# Patient Record
Sex: MALE | Race: WHITE | URBAN - METROPOLITAN AREA
[De-identification: names, ages, dates, MRNs, and addresses within clinical notes are randomized per-mention and may not be internally consistent; named-entity substitution may affect disease eponyms.]

---

## 2020-10-13 ENCOUNTER — PROCEDURE (OUTPATIENT)
Dept: URBAN - METROPOLITAN AREA CLINIC 49 | Facility: CLINIC | Age: 70
End: 2020-10-13
Payer: COMMERCIAL

## 2020-10-13 PROCEDURE — 92134 CPTRZ OPH DX IMG PST SGM RTA: CPT | Performed by: OPHTHALMOLOGY

## 2020-10-13 PROCEDURE — 67028 INJECTION EYE DRUG: CPT | Performed by: OPHTHALMOLOGY

## 2020-10-13 ASSESSMENT — INTRAOCULAR PRESSURE
OD: 12
OS: 12

## 2020-11-10 ENCOUNTER — PROCEDURE (OUTPATIENT)
Dept: URBAN - METROPOLITAN AREA CLINIC 49 | Facility: CLINIC | Age: 70
End: 2020-11-10
Payer: MEDICARE

## 2020-11-10 PROCEDURE — 92134 CPTRZ OPH DX IMG PST SGM RTA: CPT | Performed by: OPHTHALMOLOGY

## 2020-11-10 PROCEDURE — 67028 INJECTION EYE DRUG: CPT | Performed by: OPHTHALMOLOGY

## 2020-11-10 ASSESSMENT — INTRAOCULAR PRESSURE
OD: 17
OS: 14

## 2020-12-08 ENCOUNTER — OFFICE VISIT (OUTPATIENT)
Dept: URBAN - METROPOLITAN AREA CLINIC 49 | Facility: CLINIC | Age: 70
End: 2020-12-08
Payer: MEDICARE

## 2020-12-08 DIAGNOSIS — E11.3392 TYPE 2 DIABETES MELLITUS WITH MODERATE NONPROLIFERATIVE DIABETIC RETINOPATHY WITHOUT MACULAR EDEMA, LEFT EYE: ICD-10-CM

## 2020-12-08 PROCEDURE — 92012 INTRM OPH EXAM EST PATIENT: CPT | Performed by: OPHTHALMOLOGY

## 2020-12-08 PROCEDURE — 67028 INJECTION EYE DRUG: CPT | Performed by: OPHTHALMOLOGY

## 2020-12-08 PROCEDURE — 92134 CPTRZ OPH DX IMG PST SGM RTA: CPT | Performed by: OPHTHALMOLOGY

## 2020-12-08 ASSESSMENT — INTRAOCULAR PRESSURE
OD: 19
OS: 17

## 2020-12-08 NOTE — IMPRESSION/PLAN
Impression: Type 2 diabetes mellitus with moderate nonproliferative diabetic retinopathy without macular edema, left eye: L39.3194. Plan: I emphasized the importance of blood sugar and blood pressure control. The patient understands that controlling BS and BP is the best way to stabilize vision at this time. We also discussed the importance of routine dilated eye exams.

## 2020-12-08 NOTE — IMPRESSION/PLAN
Impression: Type 2 diabetes mellitus with moderate nonproliferative diabetic retinopathy with macular edema, right eye: D93.2890. Plan: He has persistent CME OD but slightly improved, after lucentis 0.3 OD x3. OCT confirms CME OD and no IRF/SRF OS. We discussed the findings. This is likely due to DME. We discussed the natural history. Based on today's findings, I recommend treatment to reduce the risk of vision loss. Lucentis 0.3 injected OD without complication after discussing the R/MILENA/ in detail. 
thanks [facility] WALDRON 4-6 weeks OCT Ou; lucentis 0.3 OD#5

## 2021-01-18 ENCOUNTER — OFFICE VISIT (OUTPATIENT)
Dept: URBAN - METROPOLITAN AREA CLINIC 47 | Facility: CLINIC | Age: 71
End: 2021-01-18
Payer: MEDICARE

## 2021-01-18 PROCEDURE — 67028 INJECTION EYE DRUG: CPT | Performed by: OPHTHALMOLOGY

## 2021-01-18 PROCEDURE — 92134 CPTRZ OPH DX IMG PST SGM RTA: CPT | Performed by: OPHTHALMOLOGY

## 2021-01-18 ASSESSMENT — INTRAOCULAR PRESSURE
OS: 18
OD: 17

## 2021-03-01 ENCOUNTER — OFFICE VISIT (OUTPATIENT)
Dept: URBAN - METROPOLITAN AREA CLINIC 47 | Facility: CLINIC | Age: 71
End: 2021-03-01
Payer: MEDICARE

## 2021-03-01 PROCEDURE — 67028 INJECTION EYE DRUG: CPT | Performed by: OPHTHALMOLOGY

## 2021-03-01 PROCEDURE — 92235 FLUORESCEIN ANGRPH MLTIFRAME: CPT | Performed by: OPHTHALMOLOGY

## 2021-03-01 PROCEDURE — 92134 CPTRZ OPH DX IMG PST SGM RTA: CPT | Performed by: OPHTHALMOLOGY

## 2021-03-01 ASSESSMENT — INTRAOCULAR PRESSURE
OS: 15
OD: 17

## 2021-03-01 NOTE — IMPRESSION/PLAN
Impression: Type 2 diabetes mellitus with moderate nonproliferative diabetic retinopathy without macular edema, left eye: K74.9785. Plan: I emphasized the importance of blood sugar and blood pressure control. The patient understands that controlling BS and BP is the best way to stabilize vision at this time. We also discussed the importance of routine dilated eye exams.

## 2021-03-01 NOTE — IMPRESSION/PLAN
Impression: Type 2 diabetes mellitus with moderate nonproliferative diabetic retinopathy with macular edema, right eye: F35.9932. Plan: He feels his vision is unchanged. He has persistent CME OD after lucentis 0.3 OD x5. OCT confirms CME OD>>OS. FA shows few MAs OD and CME OD>OS. We discussed the findings. This is likely due to DME. We discussed the natural history. I recommend treatment to reduce the risk of vision loss. Lucentis 0.3 injected OD without complication after discussing the R/MILENA/ in detail. He will also start PF/ketorolac QID OD. We may consider trying eylea as well. thanks [facility] RTC 4-6 weeks OCT Ou; poss lucentis 0.3 OD

## 2021-03-02 RX ORDER — PREDNISOLONE ACETATE 10 MG/ML
1 % SUSPENSION/ DROPS OPHTHALMIC
Qty: 5 | Refills: 3 | Status: INACTIVE
Start: 2021-03-02 | End: 2021-03-02

## 2021-03-02 RX ORDER — KETOROLAC TROMETHAMINE 5 MG/ML
0.5 % SOLUTION OPHTHALMIC
Qty: 5 | Refills: 3 | Status: INACTIVE
Start: 2021-03-02 | End: 2021-03-02

## 2021-04-05 ENCOUNTER — OFFICE VISIT (OUTPATIENT)
Dept: URBAN - METROPOLITAN AREA CLINIC 47 | Facility: CLINIC | Age: 71
End: 2021-04-05
Payer: MEDICARE

## 2021-04-05 PROCEDURE — 99213 OFFICE O/P EST LOW 20 MIN: CPT | Performed by: OPHTHALMOLOGY

## 2021-04-05 PROCEDURE — 67028 INJECTION EYE DRUG: CPT | Performed by: OPHTHALMOLOGY

## 2021-04-05 PROCEDURE — 92134 CPTRZ OPH DX IMG PST SGM RTA: CPT | Performed by: OPHTHALMOLOGY

## 2021-04-05 ASSESSMENT — INTRAOCULAR PRESSURE
OS: 16
OD: 23

## 2021-04-05 NOTE — IMPRESSION/PLAN
Impression: Type 2 diabetes mellitus with moderate nonproliferative diabetic retinopathy with macular edema, right eye: F30.8494. Plan: He has persistent CME OD after lucentis 0.3 OD x6 and 1 month of topical therapy. OCT confirms CME OD>>OS. FA 3/1/21 shows few MAs OD and CME OD>OS. We discussed the findings. This is likely due to DME. We discussed the natural history. Based on today's findings, I recommend treatment to reduce the risk of vision loss. Lucentis 0.3 injected OD without complication after discussing the R/MILENA/ in detail. He will also taper off PF/ketorolac OD. We may consider trying eylea as well given the edema with lucentis. thanks [employer] RTC 4-6 weeks OCT Ou; eylea  OD#1

## 2021-05-17 ENCOUNTER — PROCEDURE (OUTPATIENT)
Dept: URBAN - METROPOLITAN AREA CLINIC 47 | Facility: CLINIC | Age: 71
End: 2021-05-17
Payer: MEDICARE

## 2021-05-17 PROCEDURE — 67028 INJECTION EYE DRUG: CPT | Performed by: OPHTHALMOLOGY

## 2021-05-17 PROCEDURE — 92134 CPTRZ OPH DX IMG PST SGM RTA: CPT | Performed by: OPHTHALMOLOGY

## 2021-05-17 ASSESSMENT — INTRAOCULAR PRESSURE
OS: 11
OD: 13

## 2021-06-28 ENCOUNTER — OFFICE VISIT (OUTPATIENT)
Dept: URBAN - METROPOLITAN AREA CLINIC 47 | Facility: CLINIC | Age: 71
End: 2021-06-28
Payer: MEDICARE

## 2021-06-28 PROCEDURE — 67028 INJECTION EYE DRUG: CPT | Performed by: OPHTHALMOLOGY

## 2021-06-28 PROCEDURE — 92134 CPTRZ OPH DX IMG PST SGM RTA: CPT | Performed by: OPHTHALMOLOGY

## 2021-06-28 ASSESSMENT — INTRAOCULAR PRESSURE
OD: 23
OS: 18

## 2021-08-02 ENCOUNTER — OFFICE VISIT (OUTPATIENT)
Dept: URBAN - METROPOLITAN AREA CLINIC 47 | Facility: CLINIC | Age: 71
End: 2021-08-02
Payer: MEDICARE

## 2021-08-02 PROCEDURE — 67028 INJECTION EYE DRUG: CPT | Performed by: OPHTHALMOLOGY

## 2021-08-02 PROCEDURE — 92134 CPTRZ OPH DX IMG PST SGM RTA: CPT | Performed by: OPHTHALMOLOGY

## 2021-08-02 ASSESSMENT — INTRAOCULAR PRESSURE
OS: 18
OD: 19

## 2021-08-30 ENCOUNTER — OFFICE VISIT (OUTPATIENT)
Dept: URBAN - METROPOLITAN AREA CLINIC 47 | Facility: CLINIC | Age: 71
End: 2021-08-30
Payer: MEDICARE

## 2021-08-30 PROCEDURE — 67028 INJECTION EYE DRUG: CPT | Performed by: OPHTHALMOLOGY

## 2021-08-30 PROCEDURE — 92134 CPTRZ OPH DX IMG PST SGM RTA: CPT | Performed by: OPHTHALMOLOGY

## 2021-08-30 PROCEDURE — 99213 OFFICE O/P EST LOW 20 MIN: CPT | Performed by: OPHTHALMOLOGY

## 2021-08-30 ASSESSMENT — INTRAOCULAR PRESSURE
OD: 11
OS: 10

## 2021-08-30 NOTE — IMPRESSION/PLAN
Impression: Type 2 diabetes mellitus with moderate nonproliferative diabetic retinopathy with macular edema, right eye: T68.3448. Plan: He has persistent but improved CME OD after eylea OD x3. OCT confirms CME OD and no IRF/SRF OS. FA 3/1/21 shows few MAs OD and CME OD>OS. We discussed the findings. This is likely due to DME. We discussed the natural history. Based on today's findings, I recommend treatment to reduce the risk of vision loss. Eylea injected OD without complication after discussing the R/MILENA/ in detail. We may consider trying STK as well. thanks [employer] RTC 4-6 weeks OCT Ou; eylea  OD#5

## 2021-08-30 NOTE — IMPRESSION/PLAN
Impression: Type 2 diabetes mellitus with moderate nonproliferative diabetic retinopathy without macular edema, left eye: L02.3134. Plan: I emphasized the importance of blood sugar and blood pressure control. The patient understands that controlling BS and BP is the best way to stabilize vision at this time. We also discussed the importance of routine dilated eye exams.

## 2021-10-18 ENCOUNTER — PROCEDURE (OUTPATIENT)
Dept: URBAN - METROPOLITAN AREA CLINIC 47 | Facility: CLINIC | Age: 71
End: 2021-10-18
Payer: MEDICARE

## 2021-10-18 PROCEDURE — 92134 CPTRZ OPH DX IMG PST SGM RTA: CPT | Performed by: OPHTHALMOLOGY

## 2021-10-18 PROCEDURE — 67028 INJECTION EYE DRUG: CPT | Performed by: OPHTHALMOLOGY

## 2021-10-18 ASSESSMENT — INTRAOCULAR PRESSURE
OD: 14
OS: 16

## 2021-11-29 ENCOUNTER — PROCEDURE (OUTPATIENT)
Dept: URBAN - METROPOLITAN AREA CLINIC 47 | Facility: CLINIC | Age: 71
End: 2021-11-29
Payer: MEDICARE

## 2021-11-29 PROCEDURE — 67028 INJECTION EYE DRUG: CPT | Performed by: OPHTHALMOLOGY

## 2021-11-29 ASSESSMENT — INTRAOCULAR PRESSURE
OS: 16
OD: 14

## 2022-01-03 ENCOUNTER — OFFICE VISIT (OUTPATIENT)
Dept: URBAN - METROPOLITAN AREA CLINIC 47 | Facility: CLINIC | Age: 72
End: 2022-01-03
Payer: MEDICARE

## 2022-01-03 PROCEDURE — 67028 INJECTION EYE DRUG: CPT | Performed by: OPHTHALMOLOGY

## 2022-01-03 PROCEDURE — 99213 OFFICE O/P EST LOW 20 MIN: CPT | Performed by: OPHTHALMOLOGY

## 2022-01-03 PROCEDURE — 92134 CPTRZ OPH DX IMG PST SGM RTA: CPT | Performed by: OPHTHALMOLOGY

## 2022-01-03 ASSESSMENT — INTRAOCULAR PRESSURE
OD: 10
OS: 14

## 2022-01-03 NOTE — IMPRESSION/PLAN
Impression: Type 2 diabetes mellitus with moderate nonproliferative diabetic retinopathy with macular edema, right eye: J66.9650. Plan: He has persistent CME OD s/p eylea OD x6. OCT confirms CME OD and no IRF/SRF OS. FA 3/1/21 shows few MAs OD and CME OD>OS. We discussed the findings. This is likely due to DME. We discussed the natural history. Based on today's findings, I recommend treatment to reduce the risk of vision loss. Eylea injected OD without complication after discussing the R/MILENA/ in detail. We discussed trying ozurdex vs PPV/MP as well. thanks [employer] RTC 4-6 weeks OCT Ou; ozurdex OD#1

## 2022-01-03 NOTE — IMPRESSION/PLAN
Impression: Type 2 diabetes mellitus with moderate nonproliferative diabetic retinopathy without macular edema, left eye: N37.4298. Plan: I emphasized the importance of blood sugar and blood pressure control. The patient understands that controlling BS and BP is the best way to stabilize vision at this time. We also discussed the importance of routine dilated eye exams.

## 2022-02-08 ENCOUNTER — PROCEDURE (OUTPATIENT)
Dept: URBAN - METROPOLITAN AREA CLINIC 49 | Facility: CLINIC | Age: 72
End: 2022-02-08
Payer: MEDICARE

## 2022-02-08 DIAGNOSIS — E11.3311 TYPE 2 DIABETES MELLITUS WITH MODERATE NONPROLIFERATIVE DIABETIC RETINOPATHY WITH MACULAR EDEMA, RIGHT EYE: Primary | ICD-10-CM

## 2022-02-08 PROCEDURE — 67028 INJECTION EYE DRUG: CPT | Performed by: OPHTHALMOLOGY

## 2022-02-08 PROCEDURE — 92134 CPTRZ OPH DX IMG PST SGM RTA: CPT | Performed by: OPHTHALMOLOGY

## 2022-02-08 ASSESSMENT — INTRAOCULAR PRESSURE
OS: 17
OD: 16

## 2022-02-22 ENCOUNTER — OFFICE VISIT (OUTPATIENT)
Dept: URBAN - METROPOLITAN AREA CLINIC 49 | Facility: CLINIC | Age: 72
End: 2022-02-22
Payer: MEDICARE

## 2022-02-22 PROCEDURE — 99211 OFF/OP EST MAY X REQ PHY/QHP: CPT | Performed by: OPHTHALMOLOGY

## 2022-02-22 ASSESSMENT — INTRAOCULAR PRESSURE
OS: 17
OD: 19

## 2022-02-22 NOTE — IMPRESSION/PLAN
Impression: Type 2 diabetes mellitus with moderate nonproliferative diabetic retinopathy with macular edema, right eye: L32.3714. Plan: IOP check was in normal range OU.  
RTC 4-6 weeks OCT OU; ozurdex OD

## 2022-04-12 ENCOUNTER — PROCEDURE (OUTPATIENT)
Dept: URBAN - METROPOLITAN AREA CLINIC 49 | Facility: CLINIC | Age: 72
End: 2022-04-12
Payer: MEDICARE

## 2022-04-12 DIAGNOSIS — E11.3311 TYPE 2 DIABETES MELLITUS WITH MODERATE NONPROLIFERATIVE DIABETIC RETINOPATHY WITH MACULAR EDEMA, RIGHT EYE: Primary | ICD-10-CM

## 2022-04-12 DIAGNOSIS — E11.3392 TYPE 2 DIABETES MELLITUS WITH MODERATE NONPROLIFERATIVE DIABETIC RETINOPATHY WITHOUT MACULAR EDEMA, LEFT EYE: ICD-10-CM

## 2022-04-12 PROCEDURE — 67028 INJECTION EYE DRUG: CPT | Performed by: OPHTHALMOLOGY

## 2022-04-12 PROCEDURE — 92134 CPTRZ OPH DX IMG PST SGM RTA: CPT | Performed by: OPHTHALMOLOGY

## 2022-04-12 ASSESSMENT — INTRAOCULAR PRESSURE
OD: 18
OS: 16
OD: 25

## 2022-04-12 NOTE — IMPRESSION/PLAN
Impression: Type 2 diabetes mellitus with moderate nonproliferative diabetic retinopathy without macular edema, left eye: J33.0195. Plan: I emphasized the importance of blood sugar and blood pressure control. The patient understands that controlling BS and BP is the best way to stabilize vision at this time. We also discussed the importance of routine dilated eye exams.

## 2022-04-12 NOTE — IMPRESSION/PLAN
Impression: Type 2 diabetes mellitus with moderate nonproliferative diabetic retinopathy with macular edema, right eye: E00.5924. Plan: He feels his vision is unchanged. He has persistent but slightly improved CME OD s/p eylea OD and ozurdex OD 2/8/22. OCT confirms CME OD and no IRF/SRF OS. FA 3/1/21 shows few MAs OD and CME OD>OS. We discussed the findings. This is likely due to DME. We discussed the natural history. I recommend treatment to reduce the risk of vision loss. Eylea injected OD without complication after discussing the R/MILENA/ in detail. We discussed trying ozurdex vs PPV/MP as well. thanks [employer] RTC 4-6 weeks OCT Ou; poss ozurdex OD#2

## 2022-05-24 ENCOUNTER — OFFICE VISIT (OUTPATIENT)
Dept: URBAN - METROPOLITAN AREA CLINIC 49 | Facility: CLINIC | Age: 72
End: 2022-05-24
Payer: MEDICARE

## 2022-05-24 DIAGNOSIS — H35.3211 EXUDATIVE AGE-RELATED MACULAR DEGENERATION, RIGHT EYE, WITH ACTIVE CHOROIDAL NEOVASCULARIZATION: ICD-10-CM

## 2022-05-24 DIAGNOSIS — E11.3311 TYPE 2 DIABETES MELLITUS WITH MODERATE NONPROLIFERATIVE DIABETIC RETINOPATHY WITH MACULAR EDEMA, RIGHT EYE: Primary | ICD-10-CM

## 2022-05-24 DIAGNOSIS — E11.3392 TYPE 2 DIABETES MELLITUS WITH MODERATE NONPROLIFERATIVE DIABETIC RETINOPATHY WITHOUT MACULAR EDEMA, LEFT EYE: ICD-10-CM

## 2022-05-24 PROCEDURE — 92012 INTRM OPH EXAM EST PATIENT: CPT | Performed by: OPHTHALMOLOGY

## 2022-05-24 PROCEDURE — 67028 INJECTION EYE DRUG: CPT | Performed by: OPHTHALMOLOGY

## 2022-05-24 PROCEDURE — 92134 CPTRZ OPH DX IMG PST SGM RTA: CPT | Performed by: OPHTHALMOLOGY

## 2022-05-24 ASSESSMENT — INTRAOCULAR PRESSURE
OS: 13
OD: 15

## 2022-05-24 NOTE — IMPRESSION/PLAN
Impression: Type 2 diabetes mellitus with moderate nonproliferative diabetic retinopathy with macular edema, right eye: R36.1255. Plan: He complains of blurry vision OU. He has persistent but slightly improved CME OD s/p eylea OD and ozurdex OD 2/8/22. OCT confirms ERM/CME OD and no IRF/SRF OS. FA 3/1/21 shows few MAs OD and CME OD>OS. We discussed the findings. This is likely due to DME. We discussed the natural history. Based on today's findings I recommend treatment to reduce the risk of vision loss. He feels eylea worked better than ozurdex. Eylea injected OD without complication after discussing the R/MILENA/ in detail. There has not been significant improvement with either eylea or ozurdex, and thus, we discussed trying PPV/MP as well. thanks [employer] RTC 4-6 weeks OCT Ou; poss eylea OD

## 2022-05-24 NOTE — IMPRESSION/PLAN
Impression: Type 2 diabetes mellitus with moderate nonproliferative diabetic retinopathy without macular edema, left eye: V67.8755. Plan: I emphasized the importance of blood sugar and blood pressure control. The patient understands that controlling BS and BP is the best way to stabilize vision at this time. We also discussed the importance of routine dilated eye exams.

## 2022-06-28 ENCOUNTER — OFFICE VISIT (OUTPATIENT)
Dept: URBAN - METROPOLITAN AREA CLINIC 49 | Facility: CLINIC | Age: 72
End: 2022-06-28
Payer: MEDICARE

## 2022-06-28 DIAGNOSIS — E11.3392 TYPE 2 DIABETES MELLITUS WITH MODERATE NONPROLIFERATIVE DIABETIC RETINOPATHY WITHOUT MACULAR EDEMA, LEFT EYE: ICD-10-CM

## 2022-06-28 DIAGNOSIS — H35.371 PUCKERING OF MACULA, RIGHT EYE: ICD-10-CM

## 2022-06-28 DIAGNOSIS — E11.3311 TYPE 2 DIABETES MELLITUS WITH MODERATE NONPROLIFERATIVE DIABETIC RETINOPATHY WITH MACULAR EDEMA, RIGHT EYE: Primary | ICD-10-CM

## 2022-06-28 PROCEDURE — 67028 INJECTION EYE DRUG: CPT | Performed by: OPHTHALMOLOGY

## 2022-06-28 PROCEDURE — 92134 CPTRZ OPH DX IMG PST SGM RTA: CPT | Performed by: OPHTHALMOLOGY

## 2022-06-28 ASSESSMENT — INTRAOCULAR PRESSURE
OS: 19
OD: 15

## 2022-06-28 NOTE — IMPRESSION/PLAN
Impression: Puckering of macula, right eye: H35.371. Plan: There is an epiretinal membrane (ERM). We discussed the natural history as well as the risk and benefits of observation versus vitrectomy with membrane peeling. The patient understands that with vitrectomy, the vision can improve, but does not improve fully and sometimes the vision does not improve at all. Also, it can take months to years for the vision to improve. The risks of vitrectomy include but are not limited to infection, retinal tear or detachment, glaucoma, cataract formation in a phakic patient, hemorrhage, loss of eye and blindness, recurrent epiretinal membranes, need for additional surgery, and chronic cystoid macular edema. The patient elects to proceed with vitrectomy surgery. He understands I cannot guarantee vision improvement.

## 2022-06-28 NOTE — IMPRESSION/PLAN
Impression: Type 2 diabetes mellitus with moderate nonproliferative diabetic retinopathy with macular edema, right eye: V55.7025. Plan: He feels his vision is stable. He has persistent but slightly improved CME OD s/p eylea OD and ozurdex OD 2/8/22. OCT confirms ERM/CME OD and no IRF/SRF OS. FA 3/1/21 shows few MAs OD and CME OD>OS. We discussed the findings. This is likely due to DME and/or his ERM. We discussed the natural history. I recommend treatment to reduce the risk of vision loss. He feels eylea worked better than ozurdex. Eylea injected OD without complication after discussing the R/MILENA/ in detail. There has not been significant improvement with either eylea or ozurdex, and thus, we discussed trying PPV/MP as well. thanks [employer] PLAN: 25g PPV/MP/ICG/ILM PEEL OD

## 2022-06-28 NOTE — IMPRESSION/PLAN
Impression: Type 2 diabetes mellitus with moderate nonproliferative diabetic retinopathy without macular edema, left eye: N87.9934. Plan: I emphasized the importance of blood sugar and blood pressure control. The patient understands that controlling BS and BP is the best way to stabilize vision at this time. We also discussed the importance of routine dilated eye exams.

## 2022-07-09 ENCOUNTER — POST-OPERATIVE VISIT (OUTPATIENT)
Dept: URBAN - METROPOLITAN AREA CLINIC 7 | Facility: CLINIC | Age: 72
End: 2022-07-09
Payer: MEDICARE

## 2022-07-09 DIAGNOSIS — H35.371 PUCKERING OF MACULA, RIGHT EYE: Primary | ICD-10-CM

## 2022-07-09 PROCEDURE — 99024 POSTOP FOLLOW-UP VISIT: CPT | Performed by: OPHTHALMOLOGY

## 2022-07-09 ASSESSMENT — INTRAOCULAR PRESSURE
OS: 26
OD: 10

## 2022-07-09 NOTE — IMPRESSION/PLAN
Impression: S/P 25g PPV/MP/ICG/ILM PEEL OD - 1 Day. Puckering of macula, right eye  H35.371. Plan: PF/Oflox QID OD Sleep on side Gas precautions Resume glaucoma drops RTC 1 week DFE OD DTG

## 2022-07-15 ENCOUNTER — POST-OPERATIVE VISIT (OUTPATIENT)
Dept: URBAN - METROPOLITAN AREA CLINIC 13 | Facility: CLINIC | Age: 72
End: 2022-07-15
Payer: MEDICARE

## 2022-07-15 DIAGNOSIS — H35.371 PUCKERING OF MACULA, RIGHT EYE: Primary | ICD-10-CM

## 2022-07-15 PROCEDURE — 99024 POSTOP FOLLOW-UP VISIT: CPT | Performed by: OPHTHALMOLOGY

## 2022-07-15 ASSESSMENT — INTRAOCULAR PRESSURE
OS: 15
OD: 15

## 2022-07-15 NOTE — IMPRESSION/PLAN
Impression: S/P 25g PPV/MP/ICG/ILM PEEL OD - 7 Days. Puckering of macula, right eye  H35.371.  Plan: doing well
taper off PF
RTC 2-3 months OCT OU --Taper Prednisolone acetate 1% QID x 1 wk, TID x 1 wk, BID x 1wk, QD x 1wk, then d/c--Discontinue Ofloxacin 0.3%

## 2022-10-17 ENCOUNTER — OFFICE VISIT (OUTPATIENT)
Dept: URBAN - METROPOLITAN AREA CLINIC 47 | Facility: CLINIC | Age: 72
End: 2022-10-17
Payer: MEDICARE

## 2022-10-17 DIAGNOSIS — E11.3392 TYPE 2 DIABETES MELLITUS WITH MODERATE NONPROLIFERATIVE DIABETIC RETINOPATHY WITHOUT MACULAR EDEMA, LEFT EYE: ICD-10-CM

## 2022-10-17 DIAGNOSIS — E11.3311 TYPE 2 DIABETES MELLITUS WITH MODERATE NONPROLIFERATIVE DIABETIC RETINOPATHY WITH MACULAR EDEMA, RIGHT EYE: Primary | ICD-10-CM

## 2022-10-17 DIAGNOSIS — H35.371 PUCKERING OF MACULA, RIGHT EYE: ICD-10-CM

## 2022-10-17 PROCEDURE — 92134 CPTRZ OPH DX IMG PST SGM RTA: CPT | Performed by: OPHTHALMOLOGY

## 2022-10-17 PROCEDURE — 99213 OFFICE O/P EST LOW 20 MIN: CPT | Performed by: OPHTHALMOLOGY

## 2022-10-17 ASSESSMENT — INTRAOCULAR PRESSURE
OS: 21
OD: 21

## 2022-10-17 NOTE — IMPRESSION/PLAN
Impression: Type 2 diabetes mellitus with moderate nonproliferative diabetic retinopathy without macular edema, left eye: L87.2318. Plan: I emphasized the importance of blood sugar and blood pressure control. The patient understands that controlling BS and BP is the best way to stabilize vision at this time. We also discussed the importance of routine dilated eye exams.

## 2022-10-17 NOTE — IMPRESSION/PLAN
Impression: Type 2 diabetes mellitus with moderate nonproliferative diabetic retinopathy with macular edema, right eye: L87.8848. Plan: He's doing well s/p PPV/MP OD with nearly resolved edema OD. There was no significant improvement with either eylea or ozurdex, OCT confirms trace edema s/p ILM removal OD and no IRF/SRF OS. FA 3/1/21 shows few MAs OD and CME OD>OS. We discussed the findings. I recommend observation. He will f/u with a cataract surgeon and then with your excellent care. thanks [employer] RTC PRN

## 2022-11-08 ENCOUNTER — OFFICE VISIT (OUTPATIENT)
Dept: URBAN - METROPOLITAN AREA CLINIC 48 | Facility: CLINIC | Age: 72
End: 2022-11-08
Payer: MEDICARE

## 2022-11-08 DIAGNOSIS — E11.3311 TYPE 2 DIAB W MODERATE NONPRLF DIAB RTNOP W MACULAR EDEMA, RIGHT EYE: ICD-10-CM

## 2022-11-08 DIAGNOSIS — H25.813 COMBINED FORMS OF AGE-RELATED CATARACT, BILATERAL: Primary | ICD-10-CM

## 2022-11-08 PROCEDURE — 92134 CPTRZ OPH DX IMG PST SGM RTA: CPT | Performed by: STUDENT IN AN ORGANIZED HEALTH CARE EDUCATION/TRAINING PROGRAM

## 2022-11-08 PROCEDURE — 99204 OFFICE O/P NEW MOD 45 MIN: CPT | Performed by: STUDENT IN AN ORGANIZED HEALTH CARE EDUCATION/TRAINING PROGRAM

## 2022-11-08 ASSESSMENT — VISUAL ACUITY
OS: 20/40
OD: 20/200

## 2022-11-08 ASSESSMENT — KERATOMETRY
OS: 43.38
OD: 44.38

## 2022-11-08 ASSESSMENT — INTRAOCULAR PRESSURE
OD: 24
OS: 16

## 2022-11-08 NOTE — IMPRESSION/PLAN
Impression: Type 2 diab w moderate nonprlf diab rtnop w macular edema, right eye: e11.3311. Plan: OCT MAC findings :
OD CME 
OS Normal 

Will refer to Dr. Amber Maldonado for further evaluation before cataract surgery.  

RTC 2 weeks DFE/ MAC OCT with Dr. Amber Maldonado

## 2022-11-08 NOTE — IMPRESSION/PLAN
Impression: Combined forms of age-related cataract, bilateral: H25.813. Plan: The patient has a visually significant cataract in the right eye and left eye. After discussion with the patient and careful examination it has been determined that a cataract in the right eye and left eye  is accounting for a significant amount of patient's visual symptoms. Cataract surgery and the associated risks, benefits, alternatives, expectations, and recovery were discussed in detail with the patient. All questions were answered. The patient understands that there may be some limitation in visual potential given any pre-existing ocular disease. Discussed medication options with patient, [facility] vs Dextenza. Advised patient will be using ERX 
>>Plan for Phenylepherine
>>> needs to see Dr. Blaise Kerr before sx 
>>>>no forever young given retinopathy Schedule Cataract Surgery OD then OS RL2 
moderate dilation, NO previous LASIK surgery, Positive  alpha blockers Discussed lens options with patient.

## 2022-11-29 ENCOUNTER — OFFICE VISIT (OUTPATIENT)
Dept: URBAN - METROPOLITAN AREA CLINIC 48 | Facility: CLINIC | Age: 72
End: 2022-11-29
Payer: MEDICARE

## 2022-11-29 DIAGNOSIS — H25.813 COMBINED FORMS OF AGE-RELATED CATARACT, BILATERAL: ICD-10-CM

## 2022-11-29 DIAGNOSIS — H40.053 OCULAR HYPERTENSION, BILATERAL: ICD-10-CM

## 2022-11-29 PROCEDURE — 99214 OFFICE O/P EST MOD 30 MIN: CPT | Performed by: OPHTHALMOLOGY

## 2022-11-29 PROCEDURE — 92134 CPTRZ OPH DX IMG PST SGM RTA: CPT | Performed by: OPHTHALMOLOGY

## 2022-11-29 RX ORDER — BRIMONIDINE TARTRATE 2 MG/ML
0.2 % SOLUTION/ DROPS OPHTHALMIC
Qty: 15 | Refills: 4 | Status: ACTIVE
Start: 2022-11-29

## 2022-11-29 ASSESSMENT — INTRAOCULAR PRESSURE
OS: 23
OD: 30

## 2022-11-29 NOTE — IMPRESSION/PLAN
Impression: Ocular hypertension, bilateral: H40.053. Plan: NVI is not noted on DIL iris today. Would appreciate consultation with Dr. Pryia Vyas to help confirm that we are not dealing with early NVG. Start: Brimonidine TID OD, BID OS Please schedule Glaucoma Evaluation with Dr. Priya Vyas 2wk If NO NVG, Dr. Annelise Pemberton happy to monitor IOP after consultation. RTC Thursday IOP check **same day as Eylea inj OD

## 2022-11-29 NOTE — IMPRESSION/PLAN
Impression: Combined forms of age-related cataract, bilateral: H25.813. Plan: Will monitor for time being.

## 2022-11-29 NOTE — IMPRESSION/PLAN
Impression: Diabetes mellitus Type 2 with mild non-proliferative retinopathy with macular edema, right eye: R59.3977. Plan: Patient sugar levels have been well controlled. Do not see NV or significant IRH, however, ME has worsened this month in OD and there are subtle ME far from fovea in OS. Hx of Membrane Peel in OD w/ Dr. Anastasia Dickson. Patient best managed long term with RCA. Recommend anti VEGF injection x1 in OD for cystoid changes in OD w/ follow up in 4-6wk w/ Dr. Anastasia Dickson or Dr. Sandy Garcia if available. Please schedule: 
Eylea OD x1 To be done: Thursday 12/01 PLEASE GET AUTH
w/ 4-6wk DE/OCT w/ Dr. Anastasia Dickson or Dr. Sandy Garcia if available Azathioprine Counseling:  I discussed with the patient the risks of azathioprine including but not limited to myelosuppression, immunosuppression, hepatotoxicity, lymphoma, and infections.  The patient understands that monitoring is required including baseline LFTs, Creatinine, possible TPMP genotyping and weekly CBCs for the first month and then every 2 weeks thereafter.  The patient verbalized understanding of the proper use and possible adverse effects of azathioprine.  All of the patient's questions and concerns were addressed.

## 2022-12-01 ENCOUNTER — PROCEDURE (OUTPATIENT)
Dept: URBAN - METROPOLITAN AREA CLINIC 48 | Facility: CLINIC | Age: 72
End: 2022-12-01
Payer: MEDICARE

## 2022-12-01 DIAGNOSIS — E11.3211 TYPE 2 DIABETES MELLITUS WITH MILD NONPROLIFERATIVE DIABETIC RETINOPATHY WITH MACULAR EDEMA, RIGHT EYE: Primary | ICD-10-CM

## 2022-12-01 PROCEDURE — 67028 INJECTION EYE DRUG: CPT | Performed by: OPHTHALMOLOGY

## 2023-01-11 ENCOUNTER — OFFICE VISIT (OUTPATIENT)
Facility: LOCATION | Age: 73
End: 2023-01-11
Payer: MEDICARE

## 2023-01-11 DIAGNOSIS — E11.3392 TYPE 2 DIABETES MELLITUS WITH MODERATE NONPROLIFERATIVE DIABETIC RETINOPATHY WITHOUT MACULAR EDEMA, LEFT EYE: ICD-10-CM

## 2023-01-11 DIAGNOSIS — E11.3311 TYPE 2 DIABETES MELLITUS WITH MODERATE NONPROLIFERATIVE DIABETIC RETINOPATHY WITH MACULAR EDEMA, RIGHT EYE: Primary | ICD-10-CM

## 2023-01-11 DIAGNOSIS — H35.371 PUCKERING OF MACULA, RIGHT EYE: ICD-10-CM

## 2023-01-11 DIAGNOSIS — H25.13 AGE-RELATED NUCLEAR CATARACT, BILATERAL: ICD-10-CM

## 2023-01-11 PROCEDURE — 92134 CPTRZ OPH DX IMG PST SGM RTA: CPT | Performed by: OPHTHALMOLOGY

## 2023-01-11 PROCEDURE — 99214 OFFICE O/P EST MOD 30 MIN: CPT | Performed by: OPHTHALMOLOGY

## 2023-01-11 PROCEDURE — 67028 INJECTION EYE DRUG: CPT | Performed by: OPHTHALMOLOGY

## 2023-01-11 ASSESSMENT — INTRAOCULAR PRESSURE
OD: 17
OS: 16

## 2023-01-11 NOTE — IMPRESSION/PLAN
Impression: Type 2 diabetes mellitus with moderate nonproliferative diabetic retinopathy without macular edema, left eye: B17.5369. Plan: Retinal examination reveals non-proliferative diabetic retinopathy. The diagnosis, natural history, and prognosis of NPDR were discussed at length. The importance of blood sugar, blood pressure, and cholesterol control and their relationship to progression of diabetic retinopathy were reviewed.

## 2023-01-11 NOTE — IMPRESSION/PLAN
Impression: Type 2 diabetes mellitus with moderate nonproliferative diabetic retinopathy with macular edema, right eye: O86.2369.
-s/p PPV/MP
-s/p Eylea OD 12/1/22 OCT:
OD: ME improving OS: wnl Plan: The diagnosis and prognosis of diabetic macular edema, as well as the risks and benefits of various treatment options including focal/grid laser, steroids, Lucentis, Eylea and Avastin; along with the alternatives of observation or participation in a clinical trial, were discussed at length. The patient understands that treatment may not improve vision, but should reduce the risk of further visual loss.  

Given stability of vision and exam, pt elects to proceed with EYlea OD

RTC 4-6 weeks Eylea OD #2/3

## 2023-02-08 ENCOUNTER — OFFICE VISIT (OUTPATIENT)
Facility: LOCATION | Age: 73
End: 2023-02-08
Payer: MEDICARE

## 2023-02-08 DIAGNOSIS — E11.3311 TYPE 2 DIABETES MELLITUS WITH MODERATE NONPROLIFERATIVE DIABETIC RETINOPATHY WITH MACULAR EDEMA, RIGHT EYE: Primary | ICD-10-CM

## 2023-02-08 PROCEDURE — 67028 INJECTION EYE DRUG: CPT | Performed by: OPHTHALMOLOGY

## 2023-02-08 ASSESSMENT — INTRAOCULAR PRESSURE
OS: 13
OD: 13

## 2023-03-08 ENCOUNTER — PROCEDURE (OUTPATIENT)
Facility: LOCATION | Age: 73
End: 2023-03-08
Payer: MEDICARE

## 2023-03-08 DIAGNOSIS — E11.3311 TYPE 2 DIABETES MELLITUS WITH MODERATE NONPROLIFERATIVE DIABETIC RETINOPATHY WITH MACULAR EDEMA, RIGHT EYE: Primary | ICD-10-CM

## 2023-03-08 PROCEDURE — 67028 INJECTION EYE DRUG: CPT | Performed by: OPHTHALMOLOGY

## 2023-03-08 ASSESSMENT — INTRAOCULAR PRESSURE
OS: 13
OD: 21

## 2023-04-17 ENCOUNTER — OFFICE VISIT (OUTPATIENT)
Facility: LOCATION | Age: 73
End: 2023-04-17
Payer: MEDICARE

## 2023-04-17 DIAGNOSIS — H25.13 AGE-RELATED NUCLEAR CATARACT, BILATERAL: ICD-10-CM

## 2023-04-17 DIAGNOSIS — E11.3311 TYPE 2 DIABETES MELLITUS WITH MODERATE NONPROLIFERATIVE DIABETIC RETINOPATHY WITH MACULAR EDEMA, RIGHT EYE: Primary | ICD-10-CM

## 2023-04-17 DIAGNOSIS — E11.3392 TYPE 2 DIABETES MELLITUS WITH MODERATE NONPROLIFERATIVE DIABETIC RETINOPATHY WITHOUT MACULAR EDEMA, LEFT EYE: ICD-10-CM

## 2023-04-17 DIAGNOSIS — H35.371 PUCKERING OF MACULA, RIGHT EYE: ICD-10-CM

## 2023-04-17 PROCEDURE — 99214 OFFICE O/P EST MOD 30 MIN: CPT | Performed by: OPHTHALMOLOGY

## 2023-04-17 PROCEDURE — 67028 INJECTION EYE DRUG: CPT | Performed by: OPHTHALMOLOGY

## 2023-04-17 PROCEDURE — 92134 CPTRZ OPH DX IMG PST SGM RTA: CPT | Performed by: OPHTHALMOLOGY

## 2023-04-17 ASSESSMENT — INTRAOCULAR PRESSURE
OD: 19
OS: 23

## 2023-04-17 NOTE — IMPRESSION/PLAN
Impression: Type 2 diabetes mellitus with moderate nonproliferative diabetic retinopathy with macular edema, right eye: Q32.4627.
-s/p PPV/MP
-s/p Eylea OD 03/08/23 OCT: 04/17/23 OD: ME improving OS: wnl Plan: The diagnosis and prognosis of diabetic macular edema, as well as the risks and benefits of various treatment options including focal/grid laser, steroids, Lucentis, Eylea and Avastin; along with the alternatives of observation or participation in a clinical trial, were discussed at length. The patient understands that treatment may not improve vision, but should reduce the risk of further visual loss.  

Given stability of vision and exam, pt elects to proceed with Eylea OD

RTC 4-6 weeks Eylea OD #2/3

## 2023-04-17 NOTE — IMPRESSION/PLAN
Impression: Type 2 diabetes mellitus with moderate nonproliferative diabetic retinopathy without macular edema, left eye: X44.2789. Plan: Retinal examination reveals non-proliferative diabetic retinopathy. The diagnosis, natural history, and prognosis of NPDR were discussed at length. The importance of blood sugar, blood pressure, and cholesterol control and their relationship to progression of diabetic retinopathy were reviewed.

## 2023-05-15 ENCOUNTER — PROCEDURE (OUTPATIENT)
Facility: LOCATION | Age: 73
End: 2023-05-15
Payer: MEDICARE

## 2023-05-15 DIAGNOSIS — E11.3311 TYPE 2 DIABETES MELLITUS WITH MODERATE NONPROLIFERATIVE DIABETIC RETINOPATHY WITH MACULAR EDEMA, RIGHT EYE: Primary | ICD-10-CM

## 2023-05-15 PROCEDURE — 67028 INJECTION EYE DRUG: CPT | Performed by: OPHTHALMOLOGY

## 2023-05-15 ASSESSMENT — INTRAOCULAR PRESSURE
OS: 16
OD: 16

## 2023-06-12 ENCOUNTER — PROCEDURE (OUTPATIENT)
Facility: LOCATION | Age: 73
End: 2023-06-12
Payer: MEDICARE

## 2023-06-12 DIAGNOSIS — E11.3311 TYPE 2 DIABETES MELLITUS WITH MODERATE NONPROLIFERATIVE DIABETIC RETINOPATHY WITH MACULAR EDEMA, RIGHT EYE: Primary | ICD-10-CM

## 2023-06-12 PROCEDURE — 67028 INJECTION EYE DRUG: CPT | Performed by: OPHTHALMOLOGY

## 2023-06-12 ASSESSMENT — INTRAOCULAR PRESSURE
OS: 18
OD: 21

## 2023-07-26 ENCOUNTER — OFFICE VISIT (OUTPATIENT)
Facility: LOCATION | Age: 73
End: 2023-07-26
Payer: MEDICARE

## 2023-07-26 DIAGNOSIS — E11.3311 TYPE 2 DIABETES MELLITUS WITH MODERATE NONPROLIFERATIVE DIABETIC RETINOPATHY WITH MACULAR EDEMA, RIGHT EYE: Primary | ICD-10-CM

## 2023-07-26 DIAGNOSIS — H25.13 AGE-RELATED NUCLEAR CATARACT, BILATERAL: ICD-10-CM

## 2023-07-26 DIAGNOSIS — E11.3392 TYPE 2 DIABETES MELLITUS WITH MODERATE NONPROLIFERATIVE DIABETIC RETINOPATHY WITHOUT MACULAR EDEMA, LEFT EYE: ICD-10-CM

## 2023-07-26 DIAGNOSIS — H35.371 PUCKERING OF MACULA, RIGHT EYE: ICD-10-CM

## 2023-07-26 PROCEDURE — 67028 INJECTION EYE DRUG: CPT | Performed by: OPHTHALMOLOGY

## 2023-07-26 PROCEDURE — 92134 CPTRZ OPH DX IMG PST SGM RTA: CPT | Performed by: OPHTHALMOLOGY

## 2023-07-26 PROCEDURE — 99214 OFFICE O/P EST MOD 30 MIN: CPT | Performed by: OPHTHALMOLOGY

## 2023-07-26 ASSESSMENT — INTRAOCULAR PRESSURE
OD: 21
OS: 21

## 2023-08-22 ENCOUNTER — OFFICE VISIT (OUTPATIENT)
Dept: URBAN - METROPOLITAN AREA CLINIC 48 | Facility: CLINIC | Age: 73
End: 2023-08-22
Payer: MEDICARE

## 2023-08-22 DIAGNOSIS — H25.13 AGE-RELATED NUCLEAR CATARACT, BILATERAL: Primary | ICD-10-CM

## 2023-08-22 PROCEDURE — 99214 OFFICE O/P EST MOD 30 MIN: CPT | Performed by: OPHTHALMOLOGY

## 2023-08-22 ASSESSMENT — INTRAOCULAR PRESSURE
OS: 14
OD: 12

## 2023-08-22 ASSESSMENT — VISUAL ACUITY
OS: 20/40
OD: 20/40

## 2023-08-23 ENCOUNTER — PROCEDURE (OUTPATIENT)
Facility: LOCATION | Age: 73
End: 2023-08-23
Payer: MEDICARE

## 2023-08-23 DIAGNOSIS — E11.3311 TYPE 2 DIABETES MELLITUS WITH MODERATE NONPROLIFERATIVE DIABETIC RETINOPATHY WITH MACULAR EDEMA, RIGHT EYE: Primary | ICD-10-CM

## 2023-08-23 PROCEDURE — 67028 INJECTION EYE DRUG: CPT | Performed by: OPHTHALMOLOGY

## 2023-08-23 PROCEDURE — 92134 CPTRZ OPH DX IMG PST SGM RTA: CPT | Performed by: OPHTHALMOLOGY

## 2023-08-23 ASSESSMENT — INTRAOCULAR PRESSURE
OD: 15
OS: 13

## 2023-09-20 ENCOUNTER — TECH ONLY (OUTPATIENT)
Dept: URBAN - METROPOLITAN AREA CLINIC 48 | Facility: CLINIC | Age: 73
End: 2023-09-20
Payer: MEDICARE

## 2023-09-20 DIAGNOSIS — H25.13 AGE-RELATED NUCLEAR CATARACT, BILATERAL: Primary | ICD-10-CM

## 2023-09-20 ASSESSMENT — PACHYMETRY
OD: 2.43
OD: 22.10
OS: 2.51
OS: 22.34

## 2023-09-20 ASSESSMENT — KERATOMETRY
OD: 44.35
OS: 43.60

## 2023-10-02 ENCOUNTER — POST-OPERATIVE VISIT (OUTPATIENT)
Dept: URBAN - METROPOLITAN AREA CLINIC 48 | Facility: CLINIC | Age: 73
End: 2023-10-02
Payer: MEDICARE

## 2023-10-02 ENCOUNTER — PROCEDURE (OUTPATIENT)
Dept: URBAN - METROPOLITAN AREA SURGERY 24 | Facility: SURGERY | Age: 73
End: 2023-10-02
Payer: MEDICARE

## 2023-10-02 ENCOUNTER — Encounter (OUTPATIENT)
Dept: URBAN - METROPOLITAN AREA SURGERY 25 | Facility: SURGERY | Age: 73
End: 2023-10-02
Payer: MEDICARE

## 2023-10-02 DIAGNOSIS — Z48.810 ENCOUNTER FOR SURGICAL AFTERCARE FOLLOWING SURGERY ON A SENSE ORGAN: Primary | ICD-10-CM

## 2023-10-02 PROCEDURE — 99024 POSTOP FOLLOW-UP VISIT: CPT | Performed by: OPHTHALMOLOGY

## 2023-10-02 PROCEDURE — 66984 XCAPSL CTRC RMVL W/O ECP: CPT | Performed by: OPHTHALMOLOGY

## 2023-10-02 ASSESSMENT — INTRAOCULAR PRESSURE
OD: 22

## 2023-10-09 ENCOUNTER — POST-OPERATIVE VISIT (OUTPATIENT)
Dept: URBAN - METROPOLITAN AREA CLINIC 48 | Facility: CLINIC | Age: 73
End: 2023-10-09
Payer: MEDICARE

## 2023-10-09 DIAGNOSIS — Z48.810 ENCOUNTER FOR SURGICAL AFTERCARE FOLLOWING SURGERY ON A SENSE ORGAN: Primary | ICD-10-CM

## 2023-10-09 PROCEDURE — 99024 POSTOP FOLLOW-UP VISIT: CPT | Performed by: OPTOMETRIST

## 2023-10-09 ASSESSMENT — INTRAOCULAR PRESSURE: OD: 16

## 2023-10-16 ENCOUNTER — Encounter (OUTPATIENT)
Dept: URBAN - METROPOLITAN AREA SURGERY 25 | Facility: SURGERY | Age: 73
End: 2023-10-16
Payer: MEDICARE

## 2023-10-16 ENCOUNTER — POST-OPERATIVE VISIT (OUTPATIENT)
Dept: URBAN - METROPOLITAN AREA CLINIC 48 | Facility: CLINIC | Age: 73
End: 2023-10-16
Payer: MEDICARE

## 2023-10-16 ENCOUNTER — PROCEDURE (OUTPATIENT)
Dept: URBAN - METROPOLITAN AREA SURGERY 24 | Facility: SURGERY | Age: 73
End: 2023-10-16
Payer: MEDICARE

## 2023-10-16 DIAGNOSIS — Z96.1 PRESENCE OF INTRAOCULAR LENS: Primary | ICD-10-CM

## 2023-10-16 PROCEDURE — 66984 XCAPSL CTRC RMVL W/O ECP: CPT | Performed by: OPHTHALMOLOGY

## 2023-10-16 PROCEDURE — 99024 POSTOP FOLLOW-UP VISIT: CPT | Performed by: OPHTHALMOLOGY

## 2023-10-16 ASSESSMENT — INTRAOCULAR PRESSURE
OS: 16

## 2023-10-23 ENCOUNTER — POST-OPERATIVE VISIT (OUTPATIENT)
Dept: URBAN - METROPOLITAN AREA CLINIC 48 | Facility: CLINIC | Age: 73
End: 2023-10-23
Payer: MEDICARE

## 2023-10-23 DIAGNOSIS — Z96.1 PRESENCE OF INTRAOCULAR LENS: Primary | ICD-10-CM

## 2023-10-23 PROCEDURE — 99024 POSTOP FOLLOW-UP VISIT: CPT | Performed by: OPTOMETRIST

## 2023-10-23 ASSESSMENT — INTRAOCULAR PRESSURE: OS: 14

## 2023-11-13 ENCOUNTER — POST-OPERATIVE VISIT (OUTPATIENT)
Dept: URBAN - METROPOLITAN AREA CLINIC 48 | Facility: CLINIC | Age: 73
End: 2023-11-13
Payer: MEDICARE

## 2023-11-13 DIAGNOSIS — Z96.1 PRESENCE OF INTRAOCULAR LENS: Primary | ICD-10-CM

## 2023-11-13 PROCEDURE — 99024 POSTOP FOLLOW-UP VISIT: CPT | Performed by: OPTOMETRIST

## 2023-11-13 ASSESSMENT — INTRAOCULAR PRESSURE
OS: 16
OD: 14

## 2024-01-22 ENCOUNTER — OFFICE VISIT (OUTPATIENT)
Facility: LOCATION | Age: 74
End: 2024-01-22
Payer: MEDICARE

## 2024-01-22 DIAGNOSIS — H35.371 PUCKERING OF MACULA, RIGHT EYE: ICD-10-CM

## 2024-01-22 DIAGNOSIS — E11.3311 TYPE 2 DIABETES MELLITUS WITH MODERATE NONPROLIFERATIVE DIABETIC RETINOPATHY WITH MACULAR EDEMA, RIGHT EYE: Primary | ICD-10-CM

## 2024-01-22 DIAGNOSIS — E11.3392 TYPE 2 DIABETES MELLITUS WITH MODERATE NONPROLIFERATIVE DIABETIC RETINOPATHY WITHOUT MACULAR EDEMA, LEFT EYE: ICD-10-CM

## 2024-01-22 PROCEDURE — 99214 OFFICE O/P EST MOD 30 MIN: CPT | Performed by: OPHTHALMOLOGY

## 2024-01-22 PROCEDURE — 92134 CPTRZ OPH DX IMG PST SGM RTA: CPT | Performed by: OPHTHALMOLOGY

## 2024-01-22 ASSESSMENT — INTRAOCULAR PRESSURE
OS: 18
OD: 24

## 2024-04-25 ENCOUNTER — OFFICE VISIT (OUTPATIENT)
Dept: URBAN - METROPOLITAN AREA CLINIC 46 | Facility: CLINIC | Age: 74
End: 2024-04-25
Payer: MEDICARE

## 2024-04-25 DIAGNOSIS — E11.3311 TYPE 2 DIAB W MODERATE NONPRLF DIAB RTNOP W MACULAR EDEMA, RIGHT EYE: Primary | ICD-10-CM

## 2024-04-25 DIAGNOSIS — E11.3392 TYPE 2 DIAB W MODERATE NONPRLF DIAB RTNOP W/O MACULAR EDEMA, LEFT EYE: ICD-10-CM

## 2024-04-25 PROCEDURE — 67028 INJECTION EYE DRUG: CPT | Performed by: SPECIALIST

## 2024-04-25 PROCEDURE — 99204 OFFICE O/P NEW MOD 45 MIN: CPT | Performed by: SPECIALIST

## 2024-04-25 PROCEDURE — 92134 CPTRZ OPH DX IMG PST SGM RTA: CPT | Performed by: SPECIALIST

## 2024-04-25 ASSESSMENT — INTRAOCULAR PRESSURE
OS: 16
OD: 16

## 2024-04-26 ENCOUNTER — OFFICE VISIT (OUTPATIENT)
Dept: URBAN - METROPOLITAN AREA CLINIC 48 | Facility: CLINIC | Age: 74
End: 2024-04-26
Payer: MEDICARE

## 2024-04-26 DIAGNOSIS — H40.053 OCULAR HYPERTENSION, BILATERAL: Primary | ICD-10-CM

## 2024-04-26 PROCEDURE — 76514 ECHO EXAM OF EYE THICKNESS: CPT | Performed by: OPHTHALMOLOGY

## 2024-04-26 PROCEDURE — 99213 OFFICE O/P EST LOW 20 MIN: CPT | Performed by: OPHTHALMOLOGY

## 2024-04-26 RX ORDER — LATANOPROST 50 UG/ML
0.005 % SOLUTION OPHTHALMIC
Qty: 7.5 | Refills: 3 | Status: ACTIVE
Start: 2024-04-26

## 2024-04-26 RX ORDER — TIMOLOL MALEATE 5 MG/ML
0.5 % SOLUTION/ DROPS OPHTHALMIC
Qty: 15 | Refills: 3 | Status: ACTIVE
Start: 2024-04-26

## 2024-04-26 RX ORDER — BRIMONIDINE TARTRATE 2 MG/ML
0.2 % SOLUTION/ DROPS OPHTHALMIC
Qty: 15 | Refills: 3 | Status: ACTIVE
Start: 2024-04-26

## 2024-04-26 ASSESSMENT — INTRAOCULAR PRESSURE
OS: 16
OD: 15

## 2024-07-01 ENCOUNTER — OFFICE VISIT (OUTPATIENT)
Dept: URBAN - METROPOLITAN AREA CLINIC 47 | Facility: CLINIC | Age: 74
End: 2024-07-01
Payer: MEDICARE

## 2024-07-01 DIAGNOSIS — E11.3311 TYPE 2 DIABETES MELLITUS WITH MODERATE NONPROLIFERATIVE DIABETIC RETINOPATHY WITH MACULAR EDEMA, RIGHT EYE: Primary | ICD-10-CM

## 2024-07-01 DIAGNOSIS — E11.3392 TYPE 2 DIABETES MELLITUS WITH MODERATE NONPROLIFERATIVE DIABETIC RETINOPATHY WITHOUT MACULAR EDEMA, LEFT EYE: ICD-10-CM

## 2024-07-01 PROCEDURE — 67028 INJECTION EYE DRUG: CPT | Performed by: OPHTHALMOLOGY

## 2024-07-01 PROCEDURE — 99213 OFFICE O/P EST LOW 20 MIN: CPT | Performed by: OPHTHALMOLOGY

## 2024-07-01 PROCEDURE — 92134 CPTRZ OPH DX IMG PST SGM RTA: CPT | Performed by: OPHTHALMOLOGY

## 2024-07-01 ASSESSMENT — INTRAOCULAR PRESSURE
OS: 12
OD: 15

## 2024-10-22 ENCOUNTER — OFFICE VISIT (OUTPATIENT)
Dept: URBAN - METROPOLITAN AREA CLINIC 49 | Facility: LOCATION | Age: 74
End: 2024-10-22
Payer: MEDICARE

## 2024-10-22 DIAGNOSIS — E11.3311 TYPE 2 DIABETES MELLITUS WITH MODERATE NONPROLIFERATIVE DIABETIC RETINOPATHY WITH MACULAR EDEMA, RIGHT EYE: Primary | ICD-10-CM

## 2024-10-22 PROCEDURE — 67028 INJECTION EYE DRUG: CPT | Performed by: OPHTHALMOLOGY

## 2024-10-22 PROCEDURE — 92134 CPTRZ OPH DX IMG PST SGM RTA: CPT | Performed by: OPHTHALMOLOGY

## 2024-10-22 ASSESSMENT — INTRAOCULAR PRESSURE
OS: 18
OD: 21